# Patient Record
Sex: FEMALE | Race: WHITE | Employment: STUDENT | URBAN - METROPOLITAN AREA
[De-identification: names, ages, dates, MRNs, and addresses within clinical notes are randomized per-mention and may not be internally consistent; named-entity substitution may affect disease eponyms.]

---
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
## 2024-03-21 ENCOUNTER — OFFICE VISIT (OUTPATIENT)
Age: 19
End: 2024-03-21

## 2024-03-21 VITALS
SYSTOLIC BLOOD PRESSURE: 121 MMHG | HEIGHT: 62 IN | BODY MASS INDEX: 21.16 KG/M2 | HEART RATE: 94 BPM | TEMPERATURE: 97.7 F | WEIGHT: 115 LBS | DIASTOLIC BLOOD PRESSURE: 83 MMHG

## 2024-03-21 DIAGNOSIS — R30.0 DYSURIA: ICD-10-CM

## 2024-03-21 DIAGNOSIS — N39.0 URINARY TRACT INFECTION WITHOUT HEMATURIA, SITE UNSPECIFIED: Primary | ICD-10-CM

## 2024-03-21 LAB
BILIRUBIN, POC: NEGATIVE
BLOOD URINE, POC: ABNORMAL
CLARITY, POC: ABNORMAL
COLOR, POC: YELLOW
GLUCOSE URINE, POC: NEGATIVE
KETONES, POC: NEGATIVE
LEUKOCYTE EST, POC: ABNORMAL
NITRITE, POC: NEGATIVE
PH, POC: 6
PROTEIN, POC: ABNORMAL
SPECIFIC GRAVITY, POC: >=1.03
UROBILINOGEN, POC: ABNORMAL

## 2024-03-21 RX ORDER — FLUCONAZOLE 150 MG/1
150 TABLET ORAL ONCE
Qty: 1 TABLET | Refills: 0 | Status: SHIPPED | OUTPATIENT
Start: 2024-03-21 | End: 2024-03-21

## 2024-03-21 RX ORDER — SULFAMETHOXAZOLE AND TRIMETHOPRIM 800; 160 MG/1; MG/1
1 TABLET ORAL 2 TIMES DAILY
Qty: 14 TABLET | Refills: 0 | Status: SHIPPED | OUTPATIENT
Start: 2024-03-21 | End: 2024-03-28

## 2024-03-21 NOTE — PROGRESS NOTES
Brody Rodriguez (:  2005) is a 18 y.o. female,New patient, here for evaluation of the following chief complaint(s):  Urinary Tract Infection (Vaginal itching and burning during urination. Pt took Azo, no relief. Symptoms x 1 week for itching and one day for burning.)      ASSESSMENT/PLAN:  1. Dysuria  - POCT Urinalysis no Micro  - Culture, Urine    2. Urinary tract infection without hematuria, site unspecified  - POCT Urinalysis no Micro  - Culture, Urine  - sulfamethoxazole-trimethoprim (BACTRIM DS;SEPTRA DS) 800-160 MG per tablet; Take 1 tablet by mouth 2 times daily for 7 days  Dispense: 14 tablet; Refill: 0  - fluconazole (DIFLUCAN) 150 MG tablet; Take 1 tablet by mouth once for 1 dose  Dispense: 1 tablet; Refill: 0   -KEEP WELL HYDRATION  0 Result Notes      Component 11:15   Color, UA YELLOW   Clarity, UA CLOUDY   Glucose, UA POC NEGATIVE   Bilirubin, UA NEGATIVE   Ketones, UA NEGATIVE   Spec Grav, UA >=1.030   Blood, UA POC MODERATE   pH, UA 6.0   Protein, UA POC TRACE   Urobilinogen, UA 0.2 EU/DL   Leukocytes, UA MODERATE   Nitrite, UA NEGATIVE                     Return if symptoms worsen or fail to improve.    SUBJECTIVE/OBJECTIVE:  PRESENT TO CLINIC WITH VAGINAL ITCHING AND BURNING WITH URINATION FOR 1 WEEK.      History provided by:  Patient  Urinary Tract Infection        Vitals:    24 1114   BP: 121/83   Pulse: 94   Temp: 97.7 °F (36.5 °C)   TempSrc: Oral   Weight: 52.2 kg (115 lb)   Height: 1.575 m (5' 2\")       Review of Systems   Genitourinary:  Positive for dysuria.       Physical Exam  Constitutional:       Appearance: Normal appearance.   HENT:      Head: Normocephalic and atraumatic.      Nose: Nose normal.      Mouth/Throat:      Mouth: Mucous membranes are moist.   Eyes:      Pupils: Pupils are equal, round, and reactive to light.   Pulmonary:      Effort: Pulmonary effort is normal.      Breath sounds: Normal breath sounds.   Abdominal:      Tenderness: There is abdominal

## 2024-03-23 LAB
BACTERIA UR CULT: ABNORMAL
ORGANISM: ABNORMAL

## 2024-03-25 ENCOUNTER — TELEPHONE (OUTPATIENT)
Age: 19
End: 2024-03-25

## 2024-03-25 NOTE — TELEPHONE ENCOUNTER
Pt reached by phone to discuss urine culture.  Culture show GBS and penicillin treatment is recommended.  Pt was treated with Bactrim.  No allergy to penicillins.  Pt states symptoms have fully resolved on current treatment.  Recommended to continue to monitor symptoms and return or f/u with PCP if symptoms recur.  No further questions or concerns.

## 2024-06-18 ENCOUNTER — OFFICE VISIT (OUTPATIENT)
Age: 19
End: 2024-06-18

## 2024-06-18 VITALS
HEIGHT: 62 IN | OXYGEN SATURATION: 98 % | SYSTOLIC BLOOD PRESSURE: 113 MMHG | DIASTOLIC BLOOD PRESSURE: 75 MMHG | BODY MASS INDEX: 21.16 KG/M2 | TEMPERATURE: 99.2 F | HEART RATE: 125 BPM | WEIGHT: 115 LBS | RESPIRATION RATE: 18 BRPM

## 2024-06-18 DIAGNOSIS — J02.9 PHARYNGITIS, UNSPECIFIED ETIOLOGY: Primary | ICD-10-CM

## 2024-06-18 LAB — STREPTOCOCCUS A RNA: NORMAL

## 2024-06-18 RX ORDER — AMOXICILLIN 500 MG/1
500 CAPSULE ORAL 2 TIMES DAILY
Qty: 20 CAPSULE | Refills: 0 | Status: SHIPPED | OUTPATIENT
Start: 2024-06-18 | End: 2024-06-28

## 2024-06-18 ASSESSMENT — ENCOUNTER SYMPTOMS: SORE THROAT: 1
